# Patient Record
(demographics unavailable — no encounter records)

---

## 2025-06-18 NOTE — DISCUSSION/SUMMARY
[de-identified] : Patient appears to have a mild patellofemoral syndrome of the knees.  I discussed the diagnosis and treatment recommendations.  I recommend a course of meloxicam 15 mg each day for the next 2 weeks.  I also recommend a course of physical therapy geared towards the patellofemoral joints.  She was given a prescription for therapy and a list of therapist in the area.  I like to see her back in 1 month for follow-up and reevaluation.  I recommended a course of Mobic, 15mg per day for the next 2 weeks.  The patient was given a prescription for the Mobic with directions to take it once daily with the first meal of the day.  They were instructed to stop the medicine and call the office if there are any adverse reactions to the medicine.  At least 30 minutes was spent performing the evaluation and management on today's office visit.  This includes but is not limited to preparing to see patient including review of any test results or outside medical records, obtaining and/or reviewing separately obtained history, performing examination and evaluation, counseling and educating the patient on their diagnosis and treatment recommendations, ordering medications, tests, or procedures, documenting clinical information in the electronic health record, independently interpreting results (not separately reported) and communicating results to the patient, and coordination of care.

## 2025-06-18 NOTE — PHYSICAL EXAM
[de-identified] : The patient appears well nourished  and in no apparent distress.  The patient is alert and oriented to person, place, and time.   Affect and mood appear normal.    The head is normocephalic and atraumatic.  The eyes reveal normal sclera and extra ocular muscles are intact.   The neck appears normal with no jugular venous distention or masses noted.   Skin shows normal turgor with no evidence of eczema or psoriasis.  No respiratory distress noted.  The patient ambulates with a normal gait.  The right and left knees have normal range of motion.  There is pain with terminal flexion of the knees.  There is no effusion.  There is no joint line tenderness .   There is a negative [facility] sign.  There is no soft tissue swelling, warmth, or erythema.   There is a negative Lachman sign.  There is no instability to varus/valgus stress.  There is no instability to anterior/posterior drawer.  There is normal strength  in the quadriceps and hamstring muscles.  Strength and sensation are intact distally.  There are normal pulses distally and good capillary refill.  No edema or lymphadenopathy noted.    [de-identified] : AP, lateral, tunnel, and merchant views of the right and left knees were obtained.  The joint spaces are well maintained without evidence of degenerative arthritis. The alignment of the knee is normal.  No fractures or dislocations are noted.

## 2025-06-18 NOTE — HISTORY OF PRESENT ILLNESS
[de-identified] : This patient presents today complaining of bilateral knee pain and bilateral thigh pain intermittently for the last 2 months.  Patient does walk quite a bit.  She notes pain when she walks and when she climbs stairs.  Pain level 2 out of 10 and intermittent.  Denies radicular symptoms or numbness and tingling.  Pain localized to the anterior aspect of the knee joints.  Denies soft tissue swelling.  Does note some crepitations about the left knee.  Currently not taking any NSAIDs or pain medicine.